# Patient Record
(demographics unavailable — no encounter records)

---

## 2024-11-12 NOTE — ASSESSMENT
[FreeTextEntry1] : 79-year-old woman with well-controlled hypertension, and type 2 diabetes, complaining of fatigue -but no stone symptoms such as hematuria or flank pain.  Labs are ordered including A1c, CBC, CMP, TSH, PTH, urinalysis.

## 2024-11-12 NOTE — HISTORY OF PRESENT ILLNESS
[FreeTextEntry1] : 79-year-old woman seen for the first time a year ago, for stage IIIc Kd.  She has well-controlled hypertension and type 2 diabetes.  Her BP was 129/70 on September 30 at her visit to Dr. Zaman.  She will be visiting him again in December, and has a visit with her cardiologist, Dr. Najera on November 25.  She has primary hyperparathyroidism, and is on Cinacalcet.  Her endocrinologist is Dr. Jones.  She had an ultrasound of the abdomen ordered by her gynecologist last month, which showed no AAA, but kidneys were echogenic, measuring 11.5 to 12.7 cm, with a cyst in each, and fatty liver.  She had surgery a year ago for "inflamed appendix, after being constipated for a while.  Her surgery was done by Dr. Moise Britt.  Her only complaint is of fatigue.  She has had recurrent kidney stones and it is unclear if that is related to her primary hyperparathyroidism.  She is followed by her urologist regularly.  No stones were reported on the recent ultrasound.

## 2024-11-12 NOTE — CONSULT LETTER
[Dear  ___] : Dear  [unfilled], [Consult Letter:] : I had the pleasure of evaluating your patient, [unfilled]. [Please see my note below.] : Please see my note below. [Consult Closing:] : Thank you very much for allowing me to participate in the care of this patient.  If you have any questions, please do not hesitate to contact me. [Sincerely,] : Sincerely, [FreeTextEntry2] : Dr Zaman [FreeTextEntry3] : Sincerely,   Luis Alberto Villalobos MD, FACP [DrAlicia  ___] : Dr. CORRAL

## 2024-12-03 NOTE — ASSESSMENT
[Carbohydrate Consistent Diet] : carbohydrate consistent diet [Importance of Diet and Exercise] : importance of diet and exercise to improve glycemic control, achieve weight loss and improve cardiovascular health [Self Monitoring of Blood Glucose] : self monitoring of blood glucose [Levothyroxine] : The patient was instructed to take Levothyroxine on an empty stomach, separate from vitamins, and wait at least 30 minutes before eating

## 2024-12-06 NOTE — DATA REVIEWED
[FreeTextEntry1] : Reviewed imaging: - 2018 bone density reveals normal bone density: Left femoral neck T-score -0.9, left total hip T-score -1.0, AP T-score -0.8.  Reviewed labs: - 05/22/24 glucose 178, s. creat 0.94, A1c 6.6%, calcium 10.0, iPTH 122, TSH 4.14,  - 02/10/17 , s. creat 0.81, calcium 10.8, TSH 1.44, iPTH 93.

## 2024-12-06 NOTE — REASON FOR VISIT
[Follow - Up] : a follow-up visit [DM Type 2] : DM Type 2 [Hypothyroidism] : hypothyroidism [Hyperparathyroidism] : hyperparathyroidism [FreeTextEntry2] : Sorin Zaman MD

## 2024-12-06 NOTE — HISTORY OF PRESENT ILLNESS
[FreeTextEntry1] : 79 year old F pt, with Hx of Primary hyperparathyroidism, referred by Sorin Zaman MD, FAX [phone], presents today to establish endocrine care. Other PMHx: Breast CA, gout, No PMHx of: bone fractures,  PSHx: mastectomy 2001, FHx: Mother: Father: Siblings: No FHx of: thyroid/parathyroid conditions SHx: No smoking, no EtOH NKDA   08/13/2024 Pt is being referred for hx of primary hyperparathyroidism. She has hx of recurrent kidney stones, perforated appendix with mass effect, breast CA >20 years ago, osteopenia, and CKD-3. Pt is being treated with Calcimimetic.  2018 bone density reveals normal bone density. Left femoral neck T-score -0.9, left total hip T-score -1.0, AP T-score -0.8.  05/22/24 glucose 178, s. creat 0.94, A1c 6.6%, calcium 10.0, iPTH 122, TSH 4.14,  02/10/17 , s. creat 0.81, calcium 10.8, TSH 1.44, iPTH 93.   CC: "I was going to normal endocrinologist Dr. Sherron Leyva,last in 03/21/24, but they no longer take my insurance." Pt reports that her previous endocrinologist states her iPTH "was alright." Pt was initially dx with T2DM more than 5 years.  Initial kidney stone more than 5 years.  Elevated iPTH 7-10 years ago.   Pt reports fatigue, but is okay otherwise. She reports 2 kidney stone attacks in the past. An ultrasound completed earlier this year revealed small stones. Pt started Synthroid in 2008. She is currently doing a brain study at [facility]. 08/13/2024 A1c 6.8%.  12/03/2024  Pt has POCT 149, /75 and BMI 30.99. No significant weight change. CC: "I'm feeling well more or less. - 11/12/24 A1c 6.4%, , TSH 2.03, iPTH 138, calcium 10.7, ALT 9, ALK PHOS 141, eGFR 63,   Pt is scheduled to follow-up with PCP Austyn next week for an X-ray because in the mornings, she has leg pain and can not stand straight up. She has received steroid injections in the past, but it has been several years since the most recent one. Pt follows up with an ophthalmologist regularly. She restarted Rybelsus after her nephrologist follow-up 2 weeks ago, as it was stopped due to surgery last year.   [Medications verified as per pt on 12/03/2024] Current Medications: Synthroid 75 mcg qd 6 days a week, Rybelsus 3 mg, Atenolol 50 mg, Diovan 320 (previously 160, to protect kidneys), Amlodipine 5 mg, potassium citrate ER 15 mg, allopurinol 100, ASA 81, Atorvastatin 20, Cinacalcet HCL 30, HELD: Metformin 500 mg qd  Medication modified/added this visit: Hold Metformin 500 mg qd

## 2024-12-06 NOTE — END OF VISIT
[FreeTextEntry3] :  All medical record entries made by the Scribe were at my, Dr. Rd Jones, direction and personally dictated by me on 12/03/2024. I have reviewed the chart and agree that the record accurately reflects my personal performance of the history, physical exam, assessment and plan. I have also personally directed, reviewed and agreed with the chart. [Time Spent: ___ minutes] : I have spent [unfilled] minutes of time on the encounter which excludes teaching and separately reported services.

## 2024-12-06 NOTE — HISTORY OF PRESENT ILLNESS
[FreeTextEntry1] : 79 year old F pt, with Hx of Primary hyperparathyroidism, referred by Sorin Zaman MD, FAX [phone], presents today to establish endocrine care. Other PMHx: Breast CA, gout, No PMHx of: bone fractures,  PSHx: mastectomy 2001, FHx: Mother: Father: Siblings: No FHx of: thyroid/parathyroid conditions SHx: No smoking, no EtOH NKDA   08/13/2024 Pt is being referred for hx of primary hyperparathyroidism. She has hx of recurrent kidney stones, perforated appendix with mass effect, breast CA >20 years ago, osteopenia, and CKD-3. Pt is being treated with Calcimimetic.  2018 bone density reveals normal bone density. Left femoral neck T-score -0.9, left total hip T-score -1.0, AP T-score -0.8.  05/22/24 glucose 178, s. creat 0.94, A1c 6.6%, calcium 10.0, iPTH 122, TSH 4.14,  02/10/17 , s. creat 0.81, calcium 10.8, TSH 1.44, iPTH 93.   CC: "I was going to normal endocrinologist Dr. Sherron Leyva,last in 03/21/24, but they no longer take my insurance." Pt reports that her previous endocrinologist states her iPTH "was alright." Pt was initially dx with T2DM more than 5 years.  Initial kidney stone more than 5 years.  Elevated iPTH 7-10 years ago.   Pt reports fatigue, but is okay otherwise. She reports 2 kidney stone attacks in the past. An ultrasound completed earlier this year revealed small stones. Pt started Synthroid in 2008. She is currently doing a brain study at [facility]. 08/13/2024 A1c 6.8%.  12/03/2024  Pt has POCT 149, /75 and BMI 30.99. No significant weight change. CC: "I'm feeling well more or less. - 11/12/24 A1c 6.4%, , TSH 2.03, iPTH 138, calcium 10.7, ALT 9, ALK PHOS 141, eGFR 63,   Pt is scheduled to follow-up with PCP Austyn next week for an X-ray because in the mornings, she has leg pain and can not stand straight up. She has received steroid injections in the past, but it has been several years since the most recent one. Pt follows up with an ophthalmologist regularly. She restarted Rybelsus after her nephrologist follow-up 2 weeks ago, as it was stopped due to surgery last year.   [Medications verified as per pt on 12/03/2024] Current Medications: Synthroid 75 mcg qd 6 days a week, Rybelsus 3 mg, Atenolol 50 mg, Diovan 320 (previously 160, to protect kidneys), Amlodipine 5 mg, potassium citrate ER 15 mg, allopurinol 100, ASA 81, Atorvastatin 20, Cinacalcet HCL 30, HELD: Metformin 500 mg qd  Medication modified/added this visit: Hold Metformin 500 mg qd

## 2025-04-02 NOTE — HISTORY OF PRESENT ILLNESS
[FreeTextEntry1] : 79 year old F pt, with Hx of Primary hyperparathyroidism, referred by Sorin Zaman MD, FAX [phone], presents today to establish endocrine care. Other PMHx: Breast CA, gout, No PMHx of: bone fractures,  PSHx: mastectomy 2001, FHx: Mother: Father: Siblings: No FHx of: thyroid/parathyroid conditions SHx: No smoking, no EtOH NKDA   08/13/2024 Pt is being referred for hx of primary hyperparathyroidism. She has hx of recurrent kidney stones, perforated appendix with mass effect, breast CA >20 years ago, osteopenia, and CKD-3. Pt is being treated with Calcimimetic.  2018 bone density reveals normal bone density. Left femoral neck T-score -0.9, left total hip T-score -1.0, AP T-score -0.8.  05/22/24 glucose 178, s. creat 0.94, A1c 6.6%, calcium 10.0, iPTH 122, TSH 4.14,  02/10/17 , s. creat 0.81, calcium 10.8, TSH 1.44, iPTH 93.   CC: "I was going to normal endocrinologist Dr. Sherron Leyva,last in 03/21/24, but they no longer take my insurance." Pt reports that her previous endocrinologist states her iPTH "was alright." Pt was initially dx with T2DM more than 5 years.  Initial kidney stone more than 5 years.  Elevated iPTH 7-10 years ago.   Pt reports fatigue, but is okay otherwise. She reports 2 kidney stone attacks in the past. An ultrasound completed earlier this year revealed small stones. Pt started Synthroid in 2008. She is currently doing a brain study at [facility]. 08/13/2024 A1c 6.8%.  12/03/2024  Pt has POCT 149, /75 and BMI 30.99. No significant weight change. CC: "I'm feeling well more or less. - 11/12/24 A1c 6.4%, , TSH 2.03, iPTH 138, calcium 10.7, ALT 9, ALK PHOS 141, eGFR 63,   Pt is scheduled to follow-up with PCP Austyn next week for an X-ray because in the mornings, she has leg pain and can not stand straight up. She has received steroid injections in the past, but it has been several years since the most recent one. Pt follows up with an ophthalmologist regularly. She restarted Rybelsus after her nephrologist follow-up 2 weeks ago, as it was stopped due to surgery last year.   04/01/2025 Pt has POCT 143, /72 and BMI 31.3. No significant weight change.  CC: "I am doing good".   04/01/2025 A1c 6.2% 03/24/2024 ACR 62 Pt presents today for a f/u regarding her recent BMD scan and her thyroid. She has not seen her dentist in the last 2 years.   [Medications verified as per pt on 04/01/2025] Current Medications: Synthroid 75 mcg qd 6 days a week, Rybelsus 7mg qd (increased from 3 mg), Atenolol 50 mg, Diovan 320 (previously 160, to protect kidneys), Amlodipine 5 mg, potassium citrate ER 15 mg, allopurinol 100, ASA 81, Atorvastatin 20, Cinacalcet HCL 30 HELD: Metformin 500 mg qd

## 2025-04-02 NOTE — HISTORY OF PRESENT ILLNESS
[FreeTextEntry1] : 79 year old F pt, with Hx of Primary hyperparathyroidism, referred by Sorin Zaman MD, FAX [phone], presents today to establish endocrine care. Other PMHx: Breast CA, gout, No PMHx of: bone fractures,  PSHx: mastectomy 2001, FHx: Mother: Father: Siblings: No FHx of: thyroid/parathyroid conditions SHx: No smoking, no EtOH NKDA   08/13/2024 Pt is being referred for hx of primary hyperparathyroidism. She has hx of recurrent kidney stones, perforated appendix with mass effect, breast CA >20 years ago, osteopenia, and CKD-3. Pt is being treated with Calcimimetic.  2018 bone density reveals normal bone density. Left femoral neck T-score -0.9, left total hip T-score -1.0, AP T-score -0.8.  05/22/24 glucose 178, s. creat 0.94, A1c 6.6%, calcium 10.0, iPTH 122, TSH 4.14,  02/10/17 , s. creat 0.81, calcium 10.8, TSH 1.44, iPTH 93.   CC: "I was going to normal endocrinologist Dr. Sherron Leyva,last in 03/21/24, but they no longer take my insurance." Pt reports that her previous endocrinologist states her iPTH "was alright." Pt was initially dx with T2DM more than 5 years.  Initial kidney stone more than 5 years.  Elevated iPTH 7-10 years ago.   Pt reports fatigue, but is okay otherwise. She reports 2 kidney stone attacks in the past. An ultrasound completed earlier this year revealed small stones. Pt started Synthroid in 2008. She is currently doing a brain study at [facility]. 08/13/2024 A1c 6.8%.  12/03/2024  Pt has POCT 149, /75 and BMI 30.99. No significant weight change. CC: "I'm feeling well more or less. - 11/12/24 A1c 6.4%, , TSH 2.03, iPTH 138, calcium 10.7, ALT 9, ALK PHOS 141, eGFR 63,   Pt is scheduled to follow-up with PCP Austyn next week for an X-ray because in the mornings, she has leg pain and can not stand straight up. She has received steroid injections in the past, but it has been several years since the most recent one. Pt follows up with an ophthalmologist regularly. She restarted Rybelsus after her nephrologist follow-up 2 weeks ago, as it was stopped due to surgery last year.   04/01/2025 Pt has POCT 143, /72 and BMI 31.3. No significant weight change.  CC: "I am doing good".   04/01/2025 A1c 6.2% 03/24/2024 ACR 62 Pt presents today for a f/u regarding her recent BMD scan and her thyroid. She has not seen her dentist in the last 2 years.   [Medications verified as per pt on 04/01/2025] Current Medications: Synthroid 75 mcg qd 6 days a week, Rybelsus 7mg qd (increased from 3 mg), Atenolol 50 mg, Diovan 320 (previously 160, to protect kidneys), Amlodipine 5 mg, potassium citrate ER 15 mg, allopurinol 100, ASA 81, Atorvastatin 20, Cinacalcet HCL 30 HELD: Metformin 500 mg qd

## 2025-04-02 NOTE — END OF VISIT
[FreeTextEntry3] :  All medical record entries made by the Scribe were at my, Dr. Rd Jones, direction and personally dictated by me on 04/01/2025. I have reviewed the chart and agree that the record accurately reflects my personal performance of the history, physical exam, and assessment and plan. I have also personally directed, reviewed, and agreed with the chart. [Time Spent: ___ minutes] : I have spent [unfilled] minutes of time on the encounter which excludes teaching and separately reported services.

## 2025-04-02 NOTE — ASSESSMENT
[Carbohydrate Consistent Diet] : carbohydrate consistent diet [Importance of Diet and Exercise] : importance of diet and exercise to improve glycemic control, achieve weight loss and improve cardiovascular health [Self Monitoring of Blood Glucose] : self monitoring of blood glucose [Levothyroxine] : The patient was instructed to take Levothyroxine on an empty stomach, separate from vitamins, and wait at least 30 minutes before eating [FreeTextEntry1] : Cinacalcet

## 2025-04-02 NOTE — HISTORY OF PRESENT ILLNESS
[FreeTextEntry1] : 79 year old F pt, with Hx of Primary hyperparathyroidism, referred by Sorin Zaman MD, FAX [phone], presents today to establish endocrine care. Other PMHx: Breast CA, gout, No PMHx of: bone fractures,  PSHx: mastectomy 2001, FHx: Mother: Father: Siblings: No FHx of: thyroid/parathyroid conditions SHx: No smoking, no EtOH NKDA   08/13/2024 Pt is being referred for hx of primary hyperparathyroidism. She has hx of recurrent kidney stones, perforated appendix with mass effect, breast CA >20 years ago, osteopenia, and CKD-3. Pt is being treated with Calcimimetic.  2018 bone density reveals normal bone density. Left femoral neck T-score -0.9, left total hip T-score -1.0, AP T-score -0.8.  05/22/24 glucose 178, s. creat 0.94, A1c 6.6%, calcium 10.0, iPTH 122, TSH 4.14,  02/10/17 , s. creat 0.81, calcium 10.8, TSH 1.44, iPTH 93.   CC: "I was going to normal endocrinologist Dr. Sherron Leyva,last in 03/21/24, but they no longer take my insurance." Pt reports that her previous endocrinologist states her iPTH "was alright." Pt was initially dx with T2DM more than 5 years.  Initial kidney stone more than 5 years.  Elevated iPTH 7-10 years ago.   Pt reports fatigue, but is okay otherwise. She reports 2 kidney stone attacks in the past. An ultrasound completed earlier this year revealed small stones. Pt started Synthroid in 2008. She is currently doing a brain study at [facility]. 08/13/2024 A1c 6.8%.  12/03/2024  Pt has POCT 149, /75 and BMI 30.99. No significant weight change. CC: "I'm feeling well more or less. - 11/12/24 A1c 6.4%, , TSH 2.03, iPTH 138, calcium 10.7, ALT 9, ALK PHOS 141, eGFR 63,   Pt is scheduled to follow-up with PCP Austyn next week for an X-ray because in the mornings, she has leg pain and can not stand straight up. She has received steroid injections in the past, but it has been several years since the most recent one. Pt follows up with an ophthalmologist regularly. She restarted Rybelsus after her nephrologist follow-up 2 weeks ago, as it was stopped due to surgery last year.   04/01/2025 Pt has POCT 143, /72 and BMI 31.3. No significant weight change.  CC: "I am doing good".   04/01/2025 A1c 6.2% 03/24/2024 ACR 62 Pt presents today for a f/u regarding her recent BMD scan and her thyroid. She has not seen her dentist in the last 2 years.   [Medications verified as per pt on 04/01/2025] Current Medications: Synthroid 75 mcg qd 6 days a week, Rybelsus 7mg qd (increased from 3 mg), Atenolol 50 mg, Diovan 320 (previously 160, to protect kidneys), Amlodipine 5 mg, potassium citrate ER 15 mg, allopurinol 100, ASA 81, Atorvastatin 20, Cinacalcet HCL 30 HELD: Metformin 500 mg qd

## 2025-04-02 NOTE — END OF VISIT
[FreeTextEntry3] :  All medical record entries made by the Scribe were at my, Dr. Rd Jones, direction and personally dictated by me on 04/01/2025. I have reviewed the chart and agree that the record accurately reflects my personal performance of the history, physical exam, and assessment and plan. I have also personally directed, reviewed, and agreed with the chart. Detail Level: Detailed [Time Spent: ___ minutes] : I have spent [unfilled] minutes of time on the encounter which excludes teaching and separately reported services. Depth Of Biopsy: dermis Was A Bandage Applied: Yes Size Of Lesion In Cm: 0.6 X Size Of Lesion In Cm: 0.7 Biopsy Type: H and E Biopsy Method: Dermablade Anesthesia Type: 1% lidocaine with epinephrine Anesthesia Volume In Cc: 0.5 Additional Anesthesia Volume In Cc (Will Not Render If 0): 0 Hemostasis: Drysol Wound Care: Petrolatum Dressing: bandage Destruction After The Procedure: No Type Of Destruction Used: Curettage Curettage Text: The wound bed was treated with curettage after the biopsy was performed. Cryotherapy Text: The wound bed was treated with cryotherapy after the biopsy was performed. Electrodesiccation Text: The wound bed was treated with electrodesiccation after the biopsy was performed. Electrodesiccation And Curettage Text: The wound bed was treated with electrodesiccation and curettage after the biopsy was performed. Silver Nitrate Text: The wound bed was treated with silver nitrate after the biopsy was performed. Lab: [id number] Consent: Written consent was obtained and risks were reviewed including but not limited to scarring, infection, bleeding, scabbing, incomplete removal, nerve damage and allergy to anesthesia. Post-Care Instructions: I reviewed with the patient in detail post-care instructions. Patient is to keep the biopsy site dry overnight, and then apply bacitracin twice daily until healed. Patient may apply hydrogen peroxide soaks to remove any crusting. Notification Instructions: Patient will be notified of biopsy results. However, patient instructed to call the office if not contacted within 2 weeks. Billing Type: Third-Party Bill Information: Selecting Yes will display possible errors in your note based on the variables you have selected. This validation is only offered as a suggestion for you. PLEASE NOTE THAT THE VALIDATION TEXT WILL BE REMOVED WHEN YOU FINALIZE YOUR NOTE. IF YOU WANT TO FAX A PRELIMINARY NOTE YOU WILL NEED TO TOGGLE THIS TO 'NO' IF YOU DO NOT WANT IT IN YOUR FAXED NOTE. Size Of Lesion In Cm: 1

## 2025-04-16 NOTE — PHYSICAL EXAM
[General Appearance - Alert] : alert [General Appearance - In No Acute Distress] : in no acute distress [Sclera] : the sclera and conjunctiva were normal [PERRL With Normal Accommodation] : pupils were equal in size, round, and reactive to light [Outer Ear] : the ears and nose were normal in appearance [Neck Appearance] : the appearance of the neck was normal [Neck Cervical Mass (___cm)] : no neck mass was observed [Jugular Venous Distention Increased] : there was no jugular-venous distention [Auscultation Breath Sounds / Voice Sounds] : lungs were clear to auscultation bilaterally [Heart Rate And Rhythm] : heart rate was normal and rhythm regular [Heart Sounds] : normal S1 and S2 [Heart Sounds Gallop] : no gallops [Murmurs] : no murmurs [Heart Sounds Pericardial Friction Rub] : no pericardial rub [Skin Color & Pigmentation] : normal skin color and pigmentation [] : no rash [Skin Turgor] : normal skin turgor [Deep Tendon Reflexes (DTR)] : deep tendon reflexes were 2+ and symmetric [No Focal Deficits] : no focal deficits [Oriented To Time, Place, And Person] : oriented to person, place, and time [Impaired Insight] : insight and judgment were intact [Affect] : the affect was normal

## 2025-04-16 NOTE — ASSESSMENT
[FreeTextEntry1] : 79-year-old woman whose BP was elevated today but I believe represented whitecoat effect since previous readings have been good and her home readings are good.  Her microalbuminuria has improved dramatically by about 75%, probably due to valsartan plus minus Rybelsus.  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  Her BP is managed with atenolol 50 mg daily, amlodipine 5 mg daily, and valsartan 320 mg daily.  She is on vitamin D 2000 units but her calcium is still acceptable at 10.4.

## 2025-04-16 NOTE — HISTORY OF PRESENT ILLNESS
[FreeTextEntry1] : 79-year-old woman with a history of well-controlled hypertension and type 2 diabetes, stage III CKD with proteinuria, primary hyperparathyroidism, gout, echogenic kidneys on ultrasound, but no AAA.  She has had recurrent kidney stones.  Her latest creatinine is 0.95, BUN 11, GFR 61, calcium 10.4, K4.9, CO2 25.  Her urine microalbumin has improved dramatically from 230 down to 62.  She is on valsartan 320 mg daily.  She is also on Rybelsus, which was increased to 7 mg.  Her BP was 136/72 on April 1 with Dr. Jones.  Recent home blood pressures have been 136/72, 129/71, 120/69.  She feels generally well.  She had a bone density which showed osteoporosis in the femur, normal spine, osteopenia at the hip.  Dr. Hodgson is awaiting her dental exam, probably considering bisphosphonates.

## 2025-04-16 NOTE — CONSULT LETTER
[Dear  ___] : Dear  [unfilled], [Consult Letter:] : I had the pleasure of evaluating your patient, [unfilled]. [Please see my note below.] : Please see my note below. [Consult Closing:] : Thank you very much for allowing me to participate in the care of this patient.  If you have any questions, please do not hesitate to contact me. [Sincerely,] : Sincerely, [FreeTextEntry2] : Dr Zaman [FreeTextEntry3] : Sincerely,   Luis Alberto Villalobos MD, FACP